# Patient Record
Sex: FEMALE | Race: BLACK OR AFRICAN AMERICAN | NOT HISPANIC OR LATINO | Employment: UNEMPLOYED | URBAN - METROPOLITAN AREA
[De-identification: names, ages, dates, MRNs, and addresses within clinical notes are randomized per-mention and may not be internally consistent; named-entity substitution may affect disease eponyms.]

---

## 2018-08-12 ENCOUNTER — HOSPITAL ENCOUNTER (EMERGENCY)
Facility: OTHER | Age: 58
Discharge: HOME | End: 2018-08-12
Attending: EMERGENCY MEDICINE

## 2018-08-12 VITALS
HEIGHT: 69 IN | RESPIRATION RATE: 18 BRPM | DIASTOLIC BLOOD PRESSURE: 68 MMHG | TEMPERATURE: 98 F | SYSTOLIC BLOOD PRESSURE: 118 MMHG | OXYGEN SATURATION: 100 % | WEIGHT: 151 LBS | BODY MASS INDEX: 22.36 KG/M2 | HEART RATE: 70 BPM

## 2018-08-12 DIAGNOSIS — L50.9 URTICARIA: Primary | ICD-10-CM

## 2018-08-12 PROCEDURE — 96372 THER/PROPH/DIAG INJ SC/IM: CPT

## 2018-08-12 PROCEDURE — 63600175 PHARM REV CODE 636 W HCPCS: Performed by: EMERGENCY MEDICINE

## 2018-08-12 PROCEDURE — 99283 EMERGENCY DEPT VISIT LOW MDM: CPT | Mod: 25

## 2018-08-12 RX ORDER — DEXAMETHASONE SODIUM PHOSPHATE 4 MG/ML
8 INJECTION, SOLUTION INTRA-ARTICULAR; INTRALESIONAL; INTRAMUSCULAR; INTRAVENOUS; SOFT TISSUE
Status: COMPLETED | OUTPATIENT
Start: 2018-08-12 | End: 2018-08-12

## 2018-08-12 RX ORDER — OXYCODONE AND ACETAMINOPHEN TABLETS 10; 300 MG/1; MG/1
1 TABLET ORAL EVERY 4 HOURS PRN
COMMUNITY

## 2018-08-12 RX ORDER — METHYLPREDNISOLONE 4 MG/1
TABLET ORAL
Qty: 1 PACKAGE | Refills: 0 | Status: SHIPPED | OUTPATIENT
Start: 2018-08-12 | End: 2018-09-02

## 2018-08-12 RX ADMIN — DEXAMETHASONE SODIUM PHOSPHATE 8 MG: 4 INJECTION, SOLUTION INTRAMUSCULAR; INTRAVENOUS at 10:08

## 2018-08-12 NOTE — ED NOTES
Patient Identifiers for Jones Licea checked and correct  LOC: The patient is awake, alert and aware of environment with an appropriate affect, the patient is oriented x 3 and speaking appropriate.  APPEARANCE: Patient resting comfortably and in no acute distress. The patient is clean and well groomed. The patient's clothing is properly fastened.  SKIN: The skin is warm and dry. The patient has normal skin turgor and moist mucus membranes. Hives to left elbow with swelling and to left lateral upper arm.   Musculoskeletal :  Normal range of motion noted. Moves all extremities well, No swelling or tenderness noted

## 2018-08-12 NOTE — ED TRIAGE NOTES
Pt noticed hives with itching and burning to LLE since Friday. Pt took benadryl orally as well as benadryl cream and spray. She states it started out with three small red spots on her arm and progressively got worse.

## 2018-08-12 NOTE — ED PROVIDER NOTES
Encounter Date: 2018    SCRIBE #1 NOTE: Dmitry REESE, leo scribing for, and in the presence of, Dr. Reis.       History     Chief Complaint   Patient presents with    Urticaria     Pt c/o urticaria on the LUE which started Friday. Pt has taken benadryl & creams with worsening symptoms. Pt reports hives now on the RUE. Denies SOB or throat swelling.     Seen by provider: 9:56 AM    Patient is a 57 y.o. female who presents to the ED with complaint of rash to her left arm. Patient reports sudden onset of left arm itching while sitting in an armchair two days ago. She reports taking two benadryl that night with temporary relief to itching, however itching and rash returned worse yesterday and has progressively worsened since. She reports many red itchy lesions to her left arm, which have spread up the arm since onset, as well as one lesion over the right elbow that appeared today. She reports taking two benadryl this morning without any relief. She states the lesions feel hot to the touch. She denies wheezing, shortness of breath, chest tightness, throat tightness, lip swelling, facial swelling, or tongue swelling. She denies any new products or exposures, and does not recall being bitten by insects. She reports one similar reaction in the past, which affected her torso, however she is unsure of the cause then. She reports having allergy testing in the past with no allergies identified. She has never had an adverse reaction to insect bites in the past. She reports being under lots of stress as her father recently passed away and his  is later this week.      The history is provided by the patient.     Review of patient's allergies indicates:  No Known Allergies  History reviewed. No pertinent past medical history.  History reviewed. No pertinent surgical history.  History reviewed. No pertinent family history.  Social History     Tobacco Use    Smoking status: Never Smoker   Substance Use  Topics    Alcohol use: No     Frequency: Never    Drug use: No     Review of Systems   Constitutional: Negative for chills and fever.   HENT: Negative for facial swelling, sore throat, trouble swallowing and voice change.    Respiratory: Negative for chest tightness, shortness of breath and wheezing.    Cardiovascular: Negative for chest pain.   Gastrointestinal: Negative for abdominal pain, nausea and vomiting.   Genitourinary: Negative for dysuria.   Musculoskeletal: Negative for back pain.   Skin: Positive for rash.   Neurological: Negative for weakness.   Hematological: Does not bruise/bleed easily.   Psychiatric/Behavioral: Negative for confusion.       Physical Exam     Initial Vitals [08/12/18 0922]   BP Pulse Resp Temp SpO2   (!) 140/66 81 18 99.1 °F (37.3 °C) 100 %      MAP       --         Physical Exam    Nursing note and vitals reviewed.  Constitutional: She appears well-developed and well-nourished. She is not diaphoretic. No distress.   HENT:   Head: Normocephalic and atraumatic.   Mouth/Throat: Oropharynx is clear and moist. No posterior oropharyngeal edema or posterior oropharyngeal erythema.   No angioedema.    Eyes: Conjunctivae and EOM are normal. Pupils are equal, round, and reactive to light.   Neck: Normal range of motion. Neck supple.   Cardiovascular: Normal rate, regular rhythm, normal heart sounds and normal pulses.   No murmur heard.  Pulmonary/Chest: Breath sounds normal. No respiratory distress. She has no wheezes. She has no rhonchi. She has no rales.   Abdominal: Soft. There is no tenderness. There is no rebound and no guarding.   Musculoskeletal: She exhibits no tenderness.   Neurological: She is alert and oriented to person, place, and time. She has normal strength. No cranial nerve deficit.   Skin: Skin is warm and dry. Rash noted.   Left upper extremity with large urticarial lesion over posterior elbow with multiple smaller lesions proximally. Small erythematous lesion over the  right olecranon. No warmth, tenderness, or drainage.    Psychiatric: She has a normal mood and affect. Her behavior is normal. Thought content normal.         ED Course   Procedures  Labs Reviewed - No data to display       Imaging Results    None          Medical Decision Making:   Initial Assessment:       Healthy 57-year-old female presents with left arm rash. She noticed onset of left arm itching and large red spot 2 days ago, with no known insect bite or new exposures.  She had some transient improvement with Benadryl, but yesterday started spreading up her left arm and today she noticed small lesion on right elbow.  No tenderness or sign of abscess or cellulitis, afebrile and well-appearing.  Unlikely to be insect bite since the distribution not consistent with bedbugs or scabies.  Could be allergic reaction to unknown substance or urticaria.  She is in town helping plan her father's  so has been under much stress recently.  She has similar episode in the past resolved with the steroid shot.  Since Benadryl has been ineffective, will give Decadron IM and discharged with Medrol Dosepak to start tomorrow only if she has recurrent symptoms, and will continue Benadryl.  She will return to the ED for any worsening rash or other concerns.                 Scribe Attestation:   Scribe #1: I performed the above scribed service and the documentation accurately describes the services I performed. I attest to the accuracy of the note.    Attending Attestation:           Physician Attestation for Scribe:  Physician Attestation Statement for Scribe #1: I, Dr. Reis, reviewed documentation, as scribed by Dmitry Fitzpatrick in my presence, and it is both accurate and complete.                    Clinical Impression:     1. Urticaria                                Angelo Reis MD  18 [id number]

## 2022-04-10 ENCOUNTER — HISTORICAL (OUTPATIENT)
Dept: ADMINISTRATIVE | Facility: HOSPITAL | Age: 62
End: 2022-04-10

## 2022-04-26 VITALS — OXYGEN SATURATION: 98 %
